# Patient Record
(demographics unavailable — no encounter records)

---

## 2025-01-10 NOTE — ASSESSMENT
[FreeTextEntry1] : Fatty liver: -TC and LDL elevated.  -Discussed therapeutic lifestyle changes to promote improved lipid metabolism.   HLD: , , HDL 61, ->, TG 99, HDL 60,  (03/24) -Calcium score 0.  -No need for meds at this time.  -Discussed therapeutic lifestyle changes to promote improved lipid metabolism.   Prediabetes: HA1c 5.7% (03/24) -Dietary and exercise changes.   Smoking cessation: Motivated to quit. -Has tried multiple times to quit.  -Will occasionally use NRT.   Follow up in 6 months.  -Check labs prior to next visit.

## 2025-01-10 NOTE — REASON FOR VISIT
[Other: ____] : [unfilled] [FreeTextEntry1] : Diagnostic Tests: --------------------- EK24-NSR. Normal EKG.  23-NSR. TWF.  ----------------------- Echo: 23-TTE: EF 62%. Trace MR.  ------------------------ Stress:  23-Exercise TTE: Exercise 9:22 min. 89% MPHR. METS 10.9. Negative for ischemia.

## 2025-01-10 NOTE — HISTORY OF PRESENT ILLNESS
[FreeTextEntry1] : Ms. García is a 53yo F with PMHx of thyroid nodule, nephrolithiasis, fatty liver who presents for follow up. Her PMD is Dr. Wolfgang Martinez. Patient had "heart scan" and was told to get a stress test. She is not active, and current smoker. She does have some CP with exertion. She wishes to quit smoking and increase her physical activity.  08/28/23-Patient is feeling well. She tried taking Chantix, but it upset her stomach. She is feeling well overall.  03/12/24-Patient feeling well. She is hesitant about possibly starting medication for HLD. She had been on Wegovy in the past but insurance denied. She is going for deviated septum surgery.  07/15/24-Patient feeling well. With some rhinorrhea post surgery. Calcium score 0.

## 2025-01-29 NOTE — DISCUSSION/SUMMARY
[FreeTextEntry1] : Asthma exacerbation, RO Covid Chest x ray Smoking counseling Recurrent bronchitis highly asthma AMANDA/ weight loss

## 2025-03-12 NOTE — DISCUSSION/SUMMARY
[FreeTextEntry1] : Asthma restart Symbicort Chest x ray noted PND, start nasal sprays Smoking counseling AMANDA/ weight loss

## 2025-04-17 NOTE — HISTORY OF PRESENT ILLNESS
[FreeTextEntry1] : 54 y/o here today for annual exam. (+) Hx hysterectomy. (+)Hx vaginal dryness - uses estrace cream with positive effect. Requesting refills.  Patient offers no complaints today. Denies abnormal vaginal discharge, pelvic pain, urinary symptoms, abnormal bleeding. She is requesting STD testing today.   Last Annual 3/2024  Last Mammo:5/2024  Last colonoscopy within last 10 years.

## 2025-04-17 NOTE — PHYSICAL EXAM
[Chaperoned Physical Exam] : A chaperone was present in the examining room during all aspects of the physical examination. [MA] : MA [FreeTextEntry2] : Adeola [Appropriately responsive] : appropriately responsive [Alert] : alert [No Acute Distress] : no acute distress [Soft] : soft [Non-tender] : non-tender [Non-distended] : non-distended [No HSM] : No HSM [No Lesions] : no lesions [No Mass] : no mass [Oriented x3] : oriented x3 [Examination Of The Breasts] : a normal appearance [No Masses] : no breast masses were palpable [Labia Majora] : normal [Labia Minora] : normal [Normal] : normal [Absent] : absent [Uterine Adnexae] : non-palpable

## 2025-05-08 NOTE — REASON FOR VISIT
[Home] : at home, [unfilled] , at the time of the visit. [Medical Office: ([facility])___] : at the medical office located in  [Telehealth (audio & video)] : This visit was provided via telehealth using real-time 2-way audio visual technology. [Verbal consent obtained from patient] : the patient, [unfilled] [Follow-Up] : a follow-up visit [Asthma] : asthma [Sleep Apnea] : sleep apnea [Cough] : cough [Shortness of Breath] : shortness of breath

## 2025-05-08 NOTE — DISCUSSION/SUMMARY
[FreeTextEntry1] : Asthma Exacerbation Chest x ray noted PND, start nasal sprays Smoking counseling AMANDA/ weight loss